# Patient Record
Sex: FEMALE | Race: WHITE | Employment: OTHER | URBAN - METROPOLITAN AREA
[De-identification: names, ages, dates, MRNs, and addresses within clinical notes are randomized per-mention and may not be internally consistent; named-entity substitution may affect disease eponyms.]

---

## 2019-02-19 ENCOUNTER — HOSPITAL ENCOUNTER (OUTPATIENT)
Dept: PHYSICAL THERAPY | Age: 84
Discharge: HOME | End: 2019-02-19
Payer: MEDICARE

## 2019-02-19 PROCEDURE — 97110 THERAPEUTIC EXERCISES: CPT

## 2019-02-19 PROCEDURE — 97161 PT EVAL LOW COMPLEX 20 MIN: CPT

## 2019-02-19 PROCEDURE — 97140 MANUAL THERAPY 1/> REGIONS: CPT

## 2019-02-19 NOTE — PROGRESS NOTES
PHYSICAL THERAPY - DAILY TREATMENT NOTE Patient Name: Saba Willingham        Date: 2019 : 1935   YES Patient  Verified Visit #:     Insurance: Payor: Diamond Yepez / Plan: [address] [place] [address] HMO / Product Type: Managed Care Medicare / In time: 11:05 A Out time: 12:00 P Total Treatment Time: 55  
 
BCBS/Medicare Time Tracking (below) Total Timed Codes (min):  55 1:1 Treatment Time:  50 TREATMENT AREA =  Knee pain, right [M25.561] SUBJECTIVE Pain Level (on 0 to 10 scale):  0  / 10 Medication Changes/New allergies or changes in medical history, any new surgeries or procedures? NO    If yes, update Summary List  
Subjective Functional Status/Changes:  []  No changes reported See POC Modalities Rationale:    PD 
 min [] Estim, type/location:   
                                 []  att     []  unatt     []  w/US     []  w/ice    []  w/heat 
 min []  Mechanical Traction: type/lbs   
               []  pro   []  sup   []  int   []  cont    []  before manual    []  after manual  
 min []  Ultrasound, settings/location:    
 min []  Iontophoresis w/ dexamethasone, location:   
                                           []  take home patch       []  in clinic  
 min []  Ice     []  Heat    location/position:   
 min []  Vasopneumatic Device, press/temp:   
 min []  Other:   
[] Skin assessment post-treatment (if applicable):   
[]  intact    []  redness- no adverse reaction    
[]redness  adverse reaction:     
10 min Therapeutic Exercise:  [x]  See flow sheet Rationale:      increase ROM and increase strength to improve the patients ability to return to pain-free ambulation 10 min Manual Therapy: Patellar mobs, gentle passive stretch into TKE as well as manual stretch into flexion in sitting Rationale:      increase ROM to improve patient's ability to amb with decreased ext lag Billed With/As: 
 [] TE 
 [] TA 
 [] Neuro [] Self Care Patient Education: [x] Review HEP [] Progressed/Changed HEP based on:  
[] positioning   [] body mechanics   [] transfers   [] heat/ice application   
[] other:   
Other Objective/Functional Measures: 
 
See POC Post Treatment Pain Level (on 0 to 10) scale:   0  / 10 ASSESSMENT Assessment/Changes in Function:  
 
See POC []  See Progress Note/Recertification Patient will continue to benefit from skilled PT services to modify and progress therapeutic interventions, address functional mobility deficits, address ROM deficits, address strength deficits and instruct in home and community integration to attain remaining goals. Progress toward goals / Updated goals: 
Progressing towards goals established at [facility] PLAN 
[]  Upgrade activities as tolerated YES Continue plan of care  
[]  Discharge due to :   
[]  Other:   
 
Therapist: Bety Chavez PT Date: 2/19/2019 Time: 4:10 PM  
 
Future Appointments Date Time Provider Angela Tao 2/22/2019  8:30 AM Caryle Rater, PT [facility]  
2/26/2019  9:30 AM Caryle Rater, PT [facility]  
3/1/2019 10:30 AM Caryle Rater, PT [facility]  
3/4/2019  9:30 AM [facility]  
3/8/2019  9:30 AM Caryle Rater, PT [facility]  
3/11/2019 10:30 AM Caryle Rater, PT [facility]  
3/15/2019 10:30 AM [facility]

## 2019-02-19 NOTE — PROGRESS NOTES
Renny Barnard [address]  [facility] [place] [facility]  [facility] 
[address], [id number], [address]  Phone: [phone]  Fax: [phone] PLAN OF CARE / STATEMENT OF MEDICAL NECESSITY FOR PHYSICAL THERAPY SERVICES Patient Name: Favio Jha : 1935 Medical  
Diagnosis: Knee pain, right [M25.561] Treatment Diagnosis: R TKR Onset Date: 19 Referral Source: Manish Smith MD Start of Care [facility] [place]): 2019 Prior Hospitalization: See medical history Provider #: [id number] Prior Level of Function: Manageable sx with ADLs, fitness program at recreational center 3 days/walk, daily walking program, amb without AD Comorbidities: H/o HTN, L THR 2008 Medications: Verified on Patient Summary List  
The Plan of Care and following information is based on the information from the initial evaluation.  
================================================================================== Assessment / key information:  Patient is a pleasant 81 y.o. female who presents to [facility] s/p R TKR on 19. Patient reports chronic h/o R knee pain which failed to respond to conservative treatment, she was hospitalized for 2 days & received home health up until 2-15-19 which were of unknown etiology. Current c/o pain are intermittent in nature & worsen with activities such as prolonged WBing such as standing & walking & after peridos of prolonged sitting when she rises as well as sleeping at night uninterrupted >2 hours & at times sleeps in her recliner. Sx improve with CP & use of tylenol or oxy prn. Average reported pain level at 2-3/10, 6-7/10 at worst & 0/10 at best. She DC use of RW as well as SPC last week with no falls or safety concerns. Upon objective evaluation, patient demonstrates ROM as follows R 4-70 deg of flexion in supine, 85 deg of flexion in sitting.   MMT revealed overall R knee strength at 4/5 within available ROM. Hypomobile patella, mod signs of swelling & warmth to palpation, otherwise incision appears to be healing well. Slight leg length discrepancy, R LE at 34.5\" as compared to 34\" on L LE. Patient can benefit PT interventions to improve ROM, decrease pain & swelling & normalize gait to facilitate return to unlimited ADLs, work activities & overall functional status. ================================================================================== Eval Complexity: History MEDIUM  Complexity : 1-2 comorbidities / personal factors will impact the outcome/ POC ;  Examination  MEDIUM Complexity : 3 Standardized tests and measures addressing body structure, function, activity limitation and / or participation in recreation ; Presentation LOW Complexity : Stable, uncomplicated ;  Decision Making MEDIUM Complexity : FOTO score of 26-74; Overall Complexity LOW Problem List: pain affecting function, decrease ROM, decrease strength, edema affecting function, impaired gait/ balance, decrease ADL/ functional abilitiies, decrease activity tolerance, decrease flexibility/ joint mobility, decrease transfer abilities and other FOTO 53 Treatment Plan may include any combination of the following: Therapeutic exercise, Therapeutic activities, Neuromuscular re-education, Physical agent/modality, Gait/balance training, Manual therapy, Aquatic therapy, Patient education, Self Care training, Functional mobility training, Home safety training and Stair training Patient / Family readiness to learn indicated by: asking questions, trying to perform skills and interestPersons(s) to be included in education: patient (P) Barriers to Learning/Limitations: None Measures taken:   
Patient Goal (s): \"less pain & better motion with knee\" Patient self reported health status: good Rehabilitation Potential: good ? Short Term Goals: To be accomplished in  2  weeks: 1) Establish HEP to prevent further disability. 2) Patient will report decreased c/o pain to < or = 1-2/10 to facilitate sleeping at night  with manageable sx in R knee. 3) Improve FOTO score from 53 points to > or = 58 points indicating improved tolerance with ADLs in regards to R knee. 4) Patient will improve R knee AROM to 0-95 degrees so ROM is available for dressing activities. ? Long Term Goals: To be accomplished in  4  weeks: 
1) Improve FOTO score from 58 points to > or = 63 points indicating improved tolerance with ADLs in regards to R knee. 2) Patient will improve R knee AROM to 0-110 degrees so ROM is available for dressing activities. 3) Improve overall strength of R knee to 5-/5 with no c/o pain upon MMT so strength is available for return to previous fitness program. 4) Patient will be able to negotiate multiple steps using 1 HR & reciprocal pattern with no safety concerns noted. Frequency / Duration:   Patient to be seen  2-3  times per week for 4  weeks: 
Patient / Caregiver education and instruction: self care, activity modification, brace/ splint application and exercises Therapist Signature: AIME Castañeda, cert MDT Date: 0/82/0043 Certification Period: 2-19-19 to 5-17-19 Time: 11:11 AM  
================================================================================== I certify that the above Physical Therapy Services are being furnished while the patient is under my care. I agree with the treatment plan and certify that this therapy is necessary. Physician Signature:       Date:      Time:  Please sign and return to [facility] [place] or you may fax the signed copy to [phone]. Thank you.

## 2019-02-22 ENCOUNTER — HOSPITAL ENCOUNTER (OUTPATIENT)
Dept: PHYSICAL THERAPY | Age: 84
Discharge: HOME | End: 2019-02-22
Payer: MEDICARE

## 2019-02-22 PROCEDURE — 97110 THERAPEUTIC EXERCISES: CPT

## 2019-02-22 NOTE — PROGRESS NOTES
PHYSICAL THERAPY - DAILY TREATMENT NOTE Patient Name: Saba Willingham        Date: 2019 : 1935   YES Patient  Verified Visit #:     Insurance: Payor: Diamond Yepez / Plan: [address] [place] [address] HMO / Product Type: Managed Care Medicare / In time: 8:25 A Out time: 9:25 A Total Treatment Time: 55  
 
BCBS/Medicare Time Tracking (below) Total Timed Codes (min):  45 1:1 Treatment Time:  40 TREATMENT AREA =  Knee pain, right [M25.561] SUBJECTIVE Pain Level (on 0 to 10 scale):  0  / 10 Medication Changes/New allergies or changes in medical history, any new surgeries or procedures? NO    If yes, update Summary List  
Subjective Functional Status/Changes:  []  No changes reported Patient reports she has f/u with MD & they ordered a flexion brace from [employer] & was given a steroid (unsure of name, not filled yet). Modalities Rationale:     decrease edema and decrease pain to improve patient's ability to return to pain-free sleeping 
 min [] Estim, type/location:   
                                 []  att     []  unatt     []  w/US     []  w/ice    []  w/heat 
 min []  Mechanical Traction: type/lbs   
               []  pro   []  sup   []  int   []  cont    []  before manual    []  after manual  
 min []  Ultrasound, settings/location:    
 min []  Iontophoresis w/ dexamethasone, location:   
                                           []  take home patch       []  in clinic  
10 min [x]  Ice     []  Heat    location/position: Supine to R knee  
 min []  Vasopneumatic Device, press/temp:   
 min []  Other:   
[] Skin assessment post-treatment (if applicable):   
[]  intact    []  redness- no adverse reaction    
[]redness  adverse reaction:     
35// 
30 min Therapeutic Exercise:  [x]  See flow sheet Rationale:      increase ROM and increase strength to improve the patients ability to return to rec stair negotiation 10 min Manual Therapy: Gentle manual stretch for flexion in supine, patellar mobs Rationale:      decrease pain and increase ROM to improve patient's ability to ambulate with decreased ext lag Billed With/As: 
 [] TE 
 [] TA 
 [] Neuro 
 [] Self Care Patient Education: [x] Review HEP [] Progressed/Changed HEP based on:  
[] positioning   [] body mechanics   [] transfers   [] heat/ice application   
[] other:   
Other Objective/Functional Measures: 
 
Initiated TE per flow sheet Post Treatment Pain Level (on 0 to 10) scale:   1  / 10 ASSESSMENT Assessment/Changes in Function:  
 
Improved tolerance to TE today as well as manual stretching 
  
[]  See Progress Note/Recertification Patient will continue to benefit from skilled PT services to modify and progress therapeutic interventions, address functional mobility deficits, address ROM deficits and address strength deficits to attain remaining goals. Progress toward goals / Updated goals: 
Progressing towards goals established at [facility] PLAN 
[]  Upgrade activities as tolerated YES Continue plan of care  
[]  Discharge due to :   
[]  Other:   
 
Therapist: Lisandro Forbes PT Date: 2/22/2019 Time: 9:22 AM  
 
Future Appointments Date Time Provider Angela Tao 2/26/2019  9:30 AM Jonathan Nieto PT [facility]  
3/1/2019 10:30 AM Jonathan Nieto PT [facility]  
3/4/2019  [facility]  
3/8/2019  9:30 AM Jonathan Nieto PT [facility]  
3/11/2019 10:30 AM Jonathan Nieto PT [facility]  
3/15/2019 10:30 AM Deonna [facility]

## 2019-02-26 ENCOUNTER — HOSPITAL ENCOUNTER (OUTPATIENT)
Dept: PHYSICAL THERAPY | Age: 84
Discharge: HOME | End: 2019-02-26
Payer: MEDICARE

## 2019-02-26 PROCEDURE — 97110 THERAPEUTIC EXERCISES: CPT

## 2019-02-26 PROCEDURE — 97140 MANUAL THERAPY 1/> REGIONS: CPT

## 2019-02-26 NOTE — PROGRESS NOTES
PHYSICAL THERAPY - DAILY TREATMENT NOTE Patient Name: Hershell Scheuermann        Date: 2019 : 1935   YES Patient  Verified Visit #:   3   of   12  Insurance: Payor: Mary Kay Albarran / Plan: [address] [place] [address] HMO / Product Type: Managed Care Medicare / In time: 9:30 A Out time: 10:30 A Total Treatment Time: 55  
 
BCBS/Medicare Time Tracking (below) Total Timed Codes (min):  45 1:1 Treatment Time:  40 TREATMENT AREA =  Knee pain, right [M25.561] SUBJECTIVE Pain Level (on 0 to 10 scale):  0  / 10 Medication Changes/New allergies or changes in medical history, any new surgeries or procedures? NO    If yes, update Summary List  
Subjective Functional Status/Changes:  []  No changes reported Patient reports she has not received any information from [employer] about her brace, she is doing her exercises as directed. Modalities Rationale:     decrease edema, decrease inflammation and decrease pain to improve patient's ability to return to pain-free ambulation  
 min [] Estim, type/location:   
                                 []  att     []  unatt     []  w/US     []  w/ice    []  w/heat 
 min []  Mechanical Traction: type/lbs   
               []  pro   []  sup   []  int   []  cont    []  before manual    []  after manual  
 min []  Ultrasound, settings/location:    
 min []  Iontophoresis w/ dexamethasone, location:   
                                           []  take home patch       []  in clinic  
10 min [x]  Ice     []  Heat    location/position: Supine to R knee   
 min []  Vasopneumatic Device, press/temp:   
 min []  Other:   
[] Skin assessment post-treatment (if applicable):   
[]  intact    []  redness- no adverse reaction    
[]redness  adverse reaction:     
35/ 
30 min Therapeutic Exercise:  [x]  See flow sheet Rationale:      increase ROM and increase strength to improve the patients ability to return to gentle squatting 10 min Manual Therapy: Patellar mobs in all directions, gentle flexion passive stretching in supine Rationale:      decrease pain and increase ROM to improve patient's ability to return to amb without ext lag Billed With/As: 
 [] TE 
 [] TA 
 [] Neuro 
 [] Self Care Patient Education: [x] Review HEP [] Progressed/Changed HEP based on:  
[] positioning   [] body mechanics   [] transfers   [] heat/ice application   
[] other:   
Other Objective/Functional Measures: Added TKE with YTB today AAROM: flexion: 100 deg in supine with belt Post Treatment Pain Level (on 0 to 10) scale:   0  / 10 ASSESSMENT Assessment/Changes in Function:  
 
Improved tolerance to flexion based stretches and manual therapy 
  
[]  See Progress Note/Recertification Patient will continue to benefit from skilled PT services to modify and progress therapeutic interventions, address functional mobility deficits, address ROM deficits, analyze and address soft tissue restrictions, address imbalance/dizziness and instruct in home and community integration to attain remaining goals. Progress toward goals / Updated goals: 
Progressing towards STG 4 (partially met) PLAN 
[]  Upgrade activities as tolerated YES Continue plan of care  
[]  Discharge due to :   
[]  Other:   
 
Therapist: Indiana Hernandez PT Date: 2/26/2019 Time: 1:41 PM  
 
Future Appointments Date Time Provider Angela Tao 3/1/2019 10:30 AM Angelia Heaton PT [facility]  
3/4/2019  9:30 AM Leyla Park [facility]  
3/8/2019  9:30 AM Angelia Heaton PT [facility]  
3/11/2019 10:30 AM Angelia Heaton PT [facility]  
3/15/2019 10:30 AM Daniel [facility]

## 2019-03-01 ENCOUNTER — HOSPITAL ENCOUNTER (OUTPATIENT)
Dept: PHYSICAL THERAPY | Age: 84
Discharge: HOME | End: 2019-03-01
Payer: MEDICARE

## 2019-03-01 PROCEDURE — 97110 THERAPEUTIC EXERCISES: CPT

## 2019-03-01 PROCEDURE — 97140 MANUAL THERAPY 1/> REGIONS: CPT

## 2019-03-04 ENCOUNTER — HOSPITAL ENCOUNTER (OUTPATIENT)
Dept: PHYSICAL THERAPY | Age: 84
Discharge: HOME | End: 2019-03-04
Payer: MEDICARE

## 2019-03-04 PROCEDURE — 97140 MANUAL THERAPY 1/> REGIONS: CPT

## 2019-03-04 PROCEDURE — 97110 THERAPEUTIC EXERCISES: CPT

## 2019-03-04 NOTE — PROGRESS NOTES
PHYSICAL THERAPY - DAILY TREATMENT NOTE Patient Name: Francisco Javier Finley        Date: 3/4/2019 : 1935   YES Patient  Verified Visit #:     Insurance: Payor: Latha Mendez / Plan: [address] [place] [address] HMO / Product Type: Managed Care Medicare / In time: 9:25A Out time: 10:30A Total Treatment Time: 65  
 
BCBS/Medicare Time Tracking (below) Total Timed Codes (min):  55 1:1 Treatment Time:  40 TREATMENT AREA =  Knee pain, right [M25.561] SUBJECTIVE Pain Level (on 0 to 10 scale):  0  / 10 Medication Changes/New allergies or changes in medical history, any new surgeries or procedures? NO    If yes, update Summary List  
Subjective Functional Status/Changes:  []  No changes reported \"I am doing good my knee is just really stiff. \"    
  
 
 Modalities Rationale:     decrease inflammation and decrease pain to improve patient's ability to perform unlimited ADLs  
 min [] Estim, type/location:   
                                 []  att     []  unatt     []  w/US     []  w/ice    []  w/heat 
 min []  Mechanical Traction: type/lbs   
               []  pro   []  sup   []  int   []  cont    []  before manual    []  after manual  
 min []  Ultrasound, settings/location:    
 min []  Iontophoresis w/ dexamethasone, location:   
                                           []  take home patch       []  in clinic  
10 min [x]  Ice     []  Heat    location/position: R knee in supine   
 min []  Vasopneumatic Device, press/temp:   
 min []  Other:   
[] Skin assessment post-treatment (if applicable):   
[]  intact    []  redness- no adverse reaction    
[]redness  adverse reaction:     
45/30 min Therapeutic Exercise:  [x]  See flow sheet Rationale:      increase ROM, increase strength, improve coordination, improve balance and increase proprioception to improve the patients ability to perform unlimited ADLs 10 min Manual Therapy: Gentle PROM into flexion in supine, gentle inferior and superior patellar mobilizations. Rationale:      decrease pain, increase ROM and increase tissue extensibility to improve patient's ability to perform unlimited dressing. Billed With/As: 
 [] TE 
 [] TA 
 [] Neuro 
 [] Self Care Patient Education: [x] Review HEP [] Progressed/Changed HEP based on:  
[] positioning   [] body mechanics   [] transfers   [] heat/ice application   
[] other:   
Other Objective/Functional Measures: 
 
Notable increase in tightness into flexion today to 97 degrees with ERP. Post Treatment Pain Level (on 0 to 10) scale:   0  / 10 ASSESSMENT Assessment/Changes in Function:  
 
Patient is making slow, gradual progress with physical therapy she continues to be limited into flexion. []  See Progress Note/Recertification Patient will continue to benefit from skilled PT services to modify and progress therapeutic interventions, address functional mobility deficits, address ROM deficits, address strength deficits, analyze and address soft tissue restrictions, analyze and cue movement patterns, analyze and modify body mechanics/ergonomics, assess and modify postural abnormalities, address imbalance/dizziness and instruct in home and community integration to attain remaining goals. Progress toward goals / Updated goals: 
Slow progress towards ROM goals. PLAN [x]  Upgrade activities as tolerated YES Continue plan of care  
[]  Discharge due to :   
[]  Other:   
 
Therapist: Mague Potter Date: 3/4/2019 Time: 1:44 PM  
 
Future Appointments Date Time Provider Angela Tao 3/8/2019  9:30 AM Angelia Heaton PT [facility]  
3/11/2019 10:30 AM Angelia Heaton PT [facility]  
3/15/2019 10:30 AM Daniel [facility]

## 2019-03-04 NOTE — PROGRESS NOTES
PHYSICAL THERAPY - DAILY TREATMENT NOTE Patient Name: Angélica Mae        Date: 3/01/2019 : 1935   YES Patient  Verified Visit #:     Insurance: Payor: Yon Solano / Plan: [address] [place] [address] HMO / Product Type: Managed Care Medicare / In time: 10:30 A Out time: 11:35 A Total Treatment Time: 60  
 
BCBS/Medicare Time Tracking (below) Total Timed Codes (min):  50 1:1 Treatment Time:  30 TREATMENT AREA =  Knee pain, right [M25.561] SUBJECTIVE Pain Level (on 0 to 10 scale):  0  / 10 Medication Changes/New allergies or changes in medical history, any new surgeries or procedures? NO    If yes, update Summary List  
Subjective Functional Status/Changes:  []  No changes reported Patient reports she is waiting on insurance approval for her knee brace but has spoken to the medical rep. Modalities Rationale:     decrease pain to improve patient's ability to return to pain-free with ADLs 
 min [] Estim, type/location:   
                                 []  att     []  unatt     []  w/US     []  w/ice    []  w/heat 
 min []  Mechanical Traction: type/lbs   
               []  pro   []  sup   []  int   []  cont    []  before manual    []  after manual  
 min []  Ultrasound, settings/location:    
 min []  Iontophoresis w/ dexamethasone, location:   
                                           []  take home patch       []  in clinic  
10 min [x]  Ice     []  Heat    location/position: Supine to R knee   
 min []  Vasopneumatic Device, press/temp:   
 min []  Other:   
[] Skin assessment post-treatment (if applicable):   
[]  intact    []  redness- no adverse reaction    
[]redness  adverse reaction:     
40/ 
20 min Therapeutic Exercise:  [x]  See flow sheet Rationale:      increase ROM and increase strength to improve the patients ability to return to pain-free standing 10 min Manual Therapy: Patellar mobs in all directions, gentle manual stretch for flexion in supine Rationale:      decrease pain and increase ROM to improve patient's ability to return to pain-free ambulation Billed With/As: 
 [] TE 
 [] TA 
 [] Neuro 
 [] Self Care Patient Education: [x] Review HEP [] Progressed/Changed HEP based on:  
[] positioning   [] body mechanics   [] transfers   [] heat/ice application   
[] other:   
Other Objective/Functional Measures: 
 
Flexion: 100 deg AAROM with belt Post Treatment Pain Level (on 0 to 10) scale:   0  / 10 ASSESSMENT Assessment/Changes in Function:  
 
Improving tolerance to self flexion stretches as well as manual therapy 
  
[]  See Progress Note/Recertification Patient will continue to benefit from skilled PT services to modify and progress therapeutic interventions, address functional mobility deficits, address ROM deficits, address strength deficits, assess and modify postural abnormalities, address imbalance/dizziness and instruct in home and community integration to attain remaining goals. Progress toward goals / Updated goals: 
Progressing towards STG 2 & 4  
 
PLAN 
[]  Upgrade activities as tolerated YES Continue plan of care  
[]  Discharge due to :   
[]  Other:   
 
Therapist: Terra Villaseñor PT Date: 3/01/2019 Time: 12:00 PM  
 
Future Appointments Date Time Provider Angela Tao 3/4/2019  9[facility]  
3/8/2019  9:30 AM Michel Mccurdy, PT [facility]  
3/11/2019 10:30 AM Michel Mccurdy, PT [facility]  
3/15/2019 10:30 AM Hermes Brittle [facility]

## 2019-03-08 ENCOUNTER — HOSPITAL ENCOUNTER (OUTPATIENT)
Dept: PHYSICAL THERAPY | Age: 84
Discharge: HOME | End: 2019-03-08
Payer: MEDICARE

## 2019-03-08 PROCEDURE — 97140 MANUAL THERAPY 1/> REGIONS: CPT

## 2019-03-08 PROCEDURE — 97110 THERAPEUTIC EXERCISES: CPT

## 2019-03-08 NOTE — PROGRESS NOTES
Renny Barnard 32  [facility]  [facility]  [facility], [address] ,[address], [address]  Phone: [phone]  Fax: [phone]  PROGRESS NOTE  Patient Name: Cecily Shi : 1935   Treatment/Medical Diagnosis: Knee pain, right [M25.561]   Referral Source: Belinda Booth MD     Date of Initial Visit: 19 Attended Visits: 6 Missed Visits: 0     SUMMARY OF TREATMENT  Therapeutic exercise including ROM, stretching, gentle strengthening, gait & balance training, postural ed, patient education, HEP instruction, CP. CURRENT STATUS  Ms. Sanders continues to make steady progress with intermittent c/o pain in R knee, she is taking tylenol prn at home to manage her pain. She reports difficulty with sleeping in her bed > 3 hours & is able to sleep in her recliner for the rest of the night. Flexion ROM continues to be limited although tolerance flexion stretches as well as manual stretches continues to improve. She has not received her flexion brace from [facility] at this time due to insurance authorization issues although good compliance with daily home program.    Goal/Measure of Progress Goal Met? 1.  Establish HEP to prevent further disability. Status at last Eval: NA Current Status: HEP established  yes   2. Patient will report decreased c/o pain to < or = 1-2/10 to facilitate sleeping at night  with manageable sx in R knee. Status at last Eval: Average 2-3/10  At worst 6-7/10  At best 0/10 Current Status: Average 2/10  At worst 6/10  At best 0/10 progressing   3. Improve FOTO score from 53 points to > or = 58 points indicating improved tolerance with ADLs in regards to R knee. Status at last Eval: 53 Current Status: TBA yes   4. Patient will improve R knee AROM to 0-95 degrees so ROM is available for dressing activities.      Status at last Eval: 4-70 deg Current Status: 3-103 deg PROM (supine) Partially met     New Goals to be achieved in __3-4__  weeks:  1. Improve FOTO score from 53 points to > or = 58 points indicating improved tolerance with ADLs in regards to R knee. 2.  Patient will improve R knee AROM to 0-110 degrees so ROM is available for dressing activities. 3.  Patient will be able to negotiate multiple steps using 1 HR & reciprocal pattern with no safety concerns noted. 4.  Improve overall strength of R knee to 5-/5 with no c/o pain upon MMT so strength is available for return to previous fitness program.       RECOMMENDATIONS  Patient to continue with PT for up to 3-4 more weeks in order to progress towards achieving all LTGs. If you have any questions/comments please contact us directly at [phone]. Thank you for allowing us to assist in the care of your patient. Therapist Signature: AIME Keyes, cert MDT Date: 9/8/3434     Time: 8:35 AM   NOTE TO PHYSICIAN:  PLEASE COMPLETE THE ORDERS BELOW AND FAX TO   [facility] [place] [facility]: [phone]. If you are unable to process this request in 24 hours please contact our office: [phone].    ___ I have read the above report and request that my patient continue as recommended.   ___ I have read the above report and request that my patient continue therapy with the following changes/special instructions:_________________________________________________________   ___ I have read the above report and request that my patient be discharged from therapy.      Physician Signature:        Date:       Time:

## 2019-03-08 NOTE — PROGRESS NOTES
PHYSICAL THERAPY - DAILY TREATMENT NOTE    Patient Name: Genny Acevedo        Date: 3/8/2019  : 1935   YES Patient  Verified  Visit #:     Insurance: Payor: Geremiasalice Weiss / Plan: [address] [place] [address] HMO / Product Type: Managed Care Medicare /      In time: 9:30 A Out time: 10:33 A   Total Treatment Time: 55     BCBS/Medicare Time Tracking (below)   Total Timed Codes (min):  45 1:1 Treatment Time:  40     TREATMENT AREA = Knee pain, right [M25.561]    SUBJECTIVE  Pain Level (on 0 to 10 scale):  0  / 10   Medication Changes/New allergies or changes in medical history, any new surgeries or procedures?     NO    If yes, update Summary List   Subjective Functional Status/Changes:  []  No changes reported     See PN to MD           Modalities Rationale:     decrease edema, decrease inflammation and decrease pain to improve patient's ability to return to pain-free ambulation    min [] Estim, type/location:                                      []  att     []  unatt     []  w/US     []  w/ice    []  w/heat    min []  Mechanical Traction: type/lbs                   []  pro   []  sup   []  int   []  cont    []  before manual    []  after manual    min []  Ultrasound, settings/location:      min []  Iontophoresis w/ dexamethasone, location:                                               []  take home patch       []  in clinic   10 min [x]  Ice     []  Heat    location/position: Supine to R knee    min []  Vasopneumatic Device, press/temp:     min []  Other:    [] Skin assessment post-treatment (if applicable):    []  intact    []  redness- no adverse reaction     []redness  adverse reaction:        35/  30 min Therapeutic Exercise:  [x]  See flow sheet   Rationale:      increase ROM and increase strength to improve the patients ability to return to pain-free standing     10 min Manual Therapy: Patellar mobs in all directions, gentle manual flexion stretch   Rationale:      decrease pain and increase ROM to improve patient's ability to return to indep dressing      Billed With/As:   [] TE   [] TA   [] Neuro   [] Self Care Patient Education: [x] Review HEP    [] Progressed/Changed HEP based on:   [] positioning   [] body mechanics   [] transfers   [] heat/ice application    [] other:      Other Objective/Functional Measures:    PROM: 3- 103 deg (supine)     Post Treatment Pain Level (on 0 to 10) scale:   0  / 10     ASSESSMENT  Assessment/Changes in Function:     Steady progress with flexion ROM, v/c for TKE with ambulation      []  See Progress Note/Recertification   Patient will continue to benefit from skilled PT services to modify and progress therapeutic interventions, address functional mobility deficits, address ROM deficits, address strength deficits, assess and modify postural abnormalities, address imbalance/dizziness and instruct in home and community integration to attain remaining goals.    Progress toward goals / Updated goals:    Progressing towards STG 2, 3, 4, STG 1 met     PLAN  []  Upgrade activities as tolerated YES Continue plan of care   []  Discharge due to :    [x]  Other: Issued PN for f/u with MD     Therapist: Lisseth Wheatley PT    Date: 3/8/2019 Time: 8:33 AM     Future Appointments   Date Time Provider Angela Tao   3/8/2019  9:30 AM Fracisco oMlina PT [facility]   3/11/2019 10:30 AM Fracisco Molina PT [facility]   3/15/2019 10:30 AM Khalif Perry [facility]

## 2019-03-11 ENCOUNTER — HOSPITAL ENCOUNTER (OUTPATIENT)
Dept: PHYSICAL THERAPY | Age: 84
Discharge: HOME | End: 2019-03-11
Payer: MEDICARE

## 2019-03-11 PROCEDURE — 97110 THERAPEUTIC EXERCISES: CPT

## 2019-03-11 PROCEDURE — 97140 MANUAL THERAPY 1/> REGIONS: CPT

## 2019-03-11 NOTE — PROGRESS NOTES
PHYSICAL THERAPY - DAILY TREATMENT NOTE    Patient Name: Vee Linares        Date: 3/11/2019  : 1935   YES Patient  Verified  Visit #:     Insurance: Payor: Renu Sanz / Plan: [address] [place] [address] HMO / Product Type: Managed Care Medicare /      In time: 10:30 A Out time: 11:30 A   Total Treatment Time: 55     BCBS/Medicare Time Tracking (below)   Total Timed Codes (min):  45 1:1 Treatment Time:  40     TREATMENT AREA =  Knee pain, right [M25.561]    SUBJECTIVE  Pain Level (on 0 to 10 scale):  0  / 10   Medication Changes/New allergies or changes in medical history, any new surgeries or procedures? NO    If yes, update Summary List   Subjective Functional Status/Changes:  []  No changes reported     Patient reports she had f/u with MD & she will have her next f/u in 2 weeks & he would like her to get to 120 degrees of knee bending.             Modalities Rationale:     decrease edema, decrease inflammation and decrease pain to improve patient's ability to return to pain-free standing   min [] Estim, type/location:                                      []  att     []  unatt     []  w/US     []  w/ice    []  w/heat    min []  Mechanical Traction: type/lbs                   []  pro   []  sup   []  int   []  cont    []  before manual    []  after manual    min []  Ultrasound, settings/location:      min []  Iontophoresis w/ dexamethasone, location:                                               []  take home patch       []  in clinic   10 min [x]  Ice     []  Heat    location/position: Supine to R knee    min []  Vasopneumatic Device, press/temp:     min []  Other:    [] Skin assessment post-treatment (if applicable):    []  intact    []  redness- no adverse reaction     []redness  adverse reaction:        25/  15 min Therapeutic Exercise:  [x]  See flow sheet   Rationale:      increase ROM and increase strength to improve the patients ability to return to pain-free standing    25 min Manual Therapy: Manual stretching to improve flexion in supine, contract relax stretch to improve flexion in prone   Rationale:      decrease pain and increase ROM to improve patient's ability to perform dressing with R knee    Billed With/As:   [] TE   [] TA   [] Neuro   [] Self Care Patient Education: [x] Review HEP    [] Progressed/Changed HEP based on:   [] positioning   [] body mechanics   [] transfers   [] heat/ice application    [] other:      Other Objective/Functional Measures:    Flexion: 104 deg with belt in supine (gentle OP from PT with this stretch)     Post Treatment Pain Level (on 0 to 10) scale:   0  / 10     ASSESSMENT  Assessment/Changes in Function:     Good tolerance to progression of flexion stretches today      []  See Progress Note/Recertification   Patient will continue to benefit from skilled PT services to modify and progress therapeutic interventions, address functional mobility deficits, address ROM deficits, address strength deficits, assess and modify postural abnormalities, address imbalance/dizziness and instruct in home and community integration to attain remaining goals.    Progress toward goals / Updated goals:    Progressing towards newly established LTGs      PLAN  []  Upgrade activities as tolerated YES Continue plan of care   []  Discharge due to :    []  Other:      Therapist: Francisco J Jon PT    Date: 3/11/2019 Time: 12:36 PM     Future Appointments   Date Time Provider Angela Tao   3/15/2019 10:30 AM [facility]   3/19/2019 12:00 PM [facility]   3/21/2019 10:00 AM Domi Piper PT [facility]   3/26/2019 12:00 PM [facility]   3/28/2019 10:30 AM Domi Piper PT [facility]   4/2/2019 10:30 AM Domi Piper PT [facility]   4/4/2019 10:30 AM [facility]   4/9/2019 10:30 AM [facility]   4/11/2019 10:30 AM [facility]

## 2019-03-15 ENCOUNTER — HOSPITAL ENCOUNTER (OUTPATIENT)
Dept: PHYSICAL THERAPY | Age: 84
Discharge: HOME | End: 2019-03-15
Payer: MEDICARE

## 2019-03-15 PROCEDURE — 97110 THERAPEUTIC EXERCISES: CPT

## 2019-03-15 PROCEDURE — 97140 MANUAL THERAPY 1/> REGIONS: CPT

## 2019-03-15 NOTE — PROGRESS NOTES
PHYSICAL THERAPY - DAILY TREATMENT NOTE    Patient Name: Nathalie Meléndez        Date: 3/15/2019  : 1935   YES Patient  Verified  Visit #:     Insurance: Payor: Reed Reynaga / Plan: [address] [place] [address] HMO / Product Type: Managed Care Medicare /      In time: 10:25A Out time: 11:45A   Total Treatment Time: 80     BCBS/Medicare Time Tracking (below)   Total Timed Codes (min):  70 1:1 Treatment Time:  40     TREATMENT AREA =  Knee pain, right [M25.561]    SUBJECTIVE  Pain Level (on 0 to 10 scale):  0  / 10   Medication Changes/New allergies or changes in medical history, any new surgeries or procedures? NO    If yes, update Summary List   Subjective Functional Status/Changes:  []  No changes reported     \"I mowed my lawn yesterday\"     Patient reports wearing dynasplint throughout the week and now is wearing it 1.5 hours at a time.             Modalities Rationale:     decrease inflammation and decrease pain to improve patient's ability to perform unlimited AROM for dressing   min [] Estim, type/location:                                      []  att     []  unatt     []  w/US     []  w/ice    []  w/heat    min []  Mechanical Traction: type/lbs                   []  pro   []  sup   []  int   []  cont    []  before manual    []  after manual    min []  Ultrasound, settings/location:      min []  Iontophoresis w/ dexamethasone, location:                                               []  take home patch       []  in clinic   10 min [x]  Ice     []  Heat    location/position: R knee in supine    min []  Vasopneumatic Device, press/temp:     min []  Other:    [] Skin assessment post-treatment (if applicable):    []  intact    []  redness- no adverse reaction     []redness  adverse reaction:        55/25 min Therapeutic Exercise:  [x]  See flow sheet   Rationale:      increase ROM, increase strength, improve coordination, improve balance and increase proprioception to improve the patients ability to perform unlimited ambulation     15 min Manual Therapy: R knee PROM in supine, prone contract relax and manual quadricep stretch, posterior glide of tibia grade 1-4   Rationale:      decrease pain, increase ROM and increase tissue extensibility to improve patient's ability to perform unlimited AROM for dressing. Billed With/As:   [] TE   [] TA   [] Neuro   [] Self Care Patient Education: [x] Review HEP    [] Progressed/Changed HEP based on:   [] positioning   [] body mechanics   [] transfers   [] heat/ice application    [] other:      Other Objective/Functional Measures:    AAROM 0-108 today. Met with dynasplint  Don Bedolla during appointment, she adjusted patient dynasplint to L 8 and educate to continue wearing 1.5 hours a day. Post Treatment Pain Level (on 0 to 10) scale:   0  / 10     ASSESSMENT  Assessment/Changes in Function:     Patient is making good progress with PT rx and use of dynasplint outside of therapy, she was educated to continue use of ice throughout the day to minimize swelling throughout the R knee. []  See Progress Note/Recertification   Patient will continue to benefit from skilled PT services to modify and progress therapeutic interventions, address functional mobility deficits, address ROM deficits, address strength deficits, analyze and address soft tissue restrictions, analyze and cue movement patterns, analyze and modify body mechanics/ergonomics, assess and modify postural abnormalities, address imbalance/dizziness and instruct in home and community integration to attain remaining goals. Progress toward goals / Updated goals:    Progressing towards ROM goals.      PLAN  [x]  Upgrade activities as tolerated YES Continue plan of care   []  Discharge due to :    []  Other:      Therapist: Rosario iDsla    Date: 3/15/2019 Time: 12:38 PM     Future Appointments   Date Time Provider Angela Tao   3/19/2019 12:00 PM [facility] 3/21/2019 10:00 AM Amanuel Patrida PT [facility]   3/26/2019 12:00 PM Juvenal Meng [facility]   3/28/2019 10:30 AM Amanuel Partida PT [facility]   4/2/2019 10:30 AM Amanuel Partida PT [facility]   4/4/2019 10:30 AM Juvenal Meng [facility]   4/9/2019 10:30 AM Juvenal Meng [facility]   4/11/2019 10:30 AM [facility]

## 2019-03-19 ENCOUNTER — HOSPITAL ENCOUNTER (OUTPATIENT)
Dept: PHYSICAL THERAPY | Age: 84
Discharge: HOME | End: 2019-03-19
Payer: MEDICARE

## 2019-03-19 PROCEDURE — 97110 THERAPEUTIC EXERCISES: CPT

## 2019-03-19 PROCEDURE — 97140 MANUAL THERAPY 1/> REGIONS: CPT

## 2019-03-19 NOTE — PROGRESS NOTES
PHYSICAL THERAPY - DAILY TREATMENT NOTE Patient Name: Stephenie Orourke        Date: 3/19/2019 : 1935   YES Patient  Verified Visit #:     Insurance: Payor: Soraida Akers / Plan: [address] [place] [address] HMO / Product Type: Managed Care Medicare / In time: 12:00P Out time: 1:00P Total Treatment Time: 60  
 
BCBS/Medicare Time Tracking (below) Total Timed Codes (min):  50 1:1 Treatment Time:  40 TREATMENT AREA =  Knee pain, right [M25.561] SUBJECTIVE Pain Level (on 0 to 10 scale):  0  / 10 Medication Changes/New allergies or changes in medical history, any new surgeries or procedures? NO    If yes, update Summary List  
Subjective Functional Status/Changes:  []  No changes reported \"I am doing okay, I feel like my knee is tight today. I didn't have a chance to wear the brace yet today. \"  Pt reports wearing dynasplint 1.5 hours a day x 2. Modalities Rationale:     decrease inflammation and decrease pain to improve patient's ability to perform unlimited ADLs  
 min [] Estim, type/location:   
                                 []  att     []  unatt     []  w/US     []  w/ice    []  w/heat 
 min []  Mechanical Traction: type/lbs   
               []  pro   []  sup   []  int   []  cont    []  before manual    []  after manual  
 min []  Ultrasound, settings/location:    
 min []  Iontophoresis w/ dexamethasone, location:   
                                           []  take home patch       []  in clinic  
10 min [x]  Ice     []  Heat    location/position: R knee in supine   
 min []  Vasopneumatic Device, press/temp:   
 min []  Other:   
[] Skin assessment post-treatment (if applicable):   
[]  intact    []  redness- no adverse reaction    
[]redness  adverse reaction:     
35/25 min Therapeutic Exercise:  [x]  See flow sheet Rationale:      increase ROM, increase strength, improve coordination, improve balance and increase proprioception to improve the patients ability to perform unlimited ADLs 15 min Manual Therapy: PROM of the R knee into flexion in supine, contract relax In prone, grade 1-4 posterior glides of tibia into flexion Rationale:      decrease pain, increase ROM and increase tissue extensibility to improve patient's ability to perform unlimited stair negoation Billed With/As: 
 [] TE 
 [] TA 
 [] Neuro 
 [] Self Care Patient Education: [x] Review HEP [] Progressed/Changed HEP based on:  
[] positioning   [] body mechanics   [] transfers   [] heat/ice application   
[] other:   
Other Objective/Functional Measures: 
 
Progressed to 8\" step up Added standing chair quad stretch Post Treatment Pain Level (on 0 to 10) scale:   0  / 10 ASSESSMENT Assessment/Changes in Function: No change in overall flexion today, 0-105 degrees with ERP, possibly due to patient not wearing dyansplint this AM.  
  
[]  See Progress Note/Recertification Patient will continue to benefit from skilled PT services to modify and progress therapeutic interventions, address functional mobility deficits, address ROM deficits, address strength deficits, analyze and address soft tissue restrictions, analyze and cue movement patterns, analyze and modify body mechanics/ergonomics, assess and modify postural abnormalities, address imbalance/dizziness and instruct in home and community integration to attain remaining goals. Progress toward goals / Updated goals: 
Slow progress towards ROM goals. PLAN [x]  Upgrade activities as tolerated YES Continue plan of care  
[]  Discharge due to :   
[]  Other:   
 
Therapist: Mague Potter Date: 3/19/2019 Time: 2:11 PM  
 
Future Appointments Date Time Provider Angela Tao 3/21/2019 10:00 AM Angelia Heaton, PT [facility]  
3/26/2019 12:00 PM [facility]  
3/28/2019 10:30 AM Castro Quispe, [facility]  
 4/2/2019 10:30 AM Kasey Stallings PT [facility]  
4/4/2019 10:30 AM Danitza Meng [facility]  
4/[facility]/2019 10:30 [facility]  
4/11/2019 10:30 AM Irene [facility]

## 2019-03-21 ENCOUNTER — HOSPITAL ENCOUNTER (OUTPATIENT)
Dept: PHYSICAL THERAPY | Age: 84
Discharge: HOME | End: 2019-03-21
Payer: MEDICARE

## 2019-03-21 PROCEDURE — 97140 MANUAL THERAPY 1/> REGIONS: CPT

## 2019-03-21 PROCEDURE — 97110 THERAPEUTIC EXERCISES: CPT

## 2019-03-21 NOTE — PROGRESS NOTES
PHYSICAL THERAPY - DAILY TREATMENT NOTE Patient Name: Nani Kelley        Date: 3/21/2019 : 1935   YES Patient  Verified Visit #:   10   of   12  Insurance: Payor: Adia Naranjo / Plan: [address] [place] [address] HMO / Product Type: Managed Care Medicare / In time: 10 A Out time: 11 A Total Treatment Time: 55  
 
BCBS/Medicare Time Tracking (below) Total Timed Codes (min):  45 1:1 Treatment Time:  40 TREATMENT AREA =  Knee pain, right [M25.561] SUBJECTIVE Pain Level (on 0 to 10 scale):  0  / 10 Medication Changes/New allergies or changes in medical history, any new surgeries or procedures? NO    If yes, update Summary List  
Subjective Functional Status/Changes:  []  No changes reported See re-cert to MD   
 
  
 
 Modalities Rationale:     decrease pain to improve patient's ability to return to pain-free standing 
 min [] Estim, type/location:   
                                 []  att     []  unatt     []  w/US     []  w/ice    []  w/heat 
 min []  Mechanical Traction: type/lbs   
               []  pro   []  sup   []  int   []  cont    []  before manual    []  after manual  
 min []  Ultrasound, settings/location:    
 min []  Iontophoresis w/ dexamethasone, location:   
                                           []  take home patch       []  in clinic  
10 min [x]  Ice     []  Heat    location/position: Supine to L hip   
 min []  Vasopneumatic Device, press/temp:   
 min []  Other:   
[] Skin assessment post-treatment (if applicable):   
[]  intact    []  redness- no adverse reaction    
[]redness  adverse reaction:     
30/ 
10 min Therapeutic Exercise:  [x]  See flow sheet Rationale:      increase ROM and increase strength to improve the patients ability to return to rec stair negotiation 15 min Manual Therapy: Gentle manual stretch for flexion in supine f/b CR flexion Rationale:      increase ROM to improve patient's ability to return to indep dressing Billed With/As: 
 [] TE 
 [] TA 
 [] Neuro 
 [] Self Care Patient Education: [x] Review HEP [] Progressed/Changed HEP based on:  
[] positioning   [] body mechanics   [] transfers   [] heat/ice application   
[] other:   
Other Objective/Functional Measures: FOTO 60 PROM: 0-110 deg in supine Post Treatment Pain Level (on 0 to 10) scale:   0  / 10 ASSESSMENT Assessment/Changes in Function:  
 
Steady progress with flexion mobility & tolerance to self stretche  
[]  See Progress Note/Recertification Patient will continue to benefit from skilled PT services to modify and progress therapeutic interventions, address functional mobility deficits, address ROM deficits, address strength deficits, address imbalance/dizziness and instruct in home and community integration to attain remaining goals. Progress toward goals / Updated goals: 
Progressing towards LTG 2, LTG 1, 3 met PLAN 
[]  Upgrade activities as tolerated YES Continue plan of care  
[]  Discharge due to :   
[x]  Other: Issued copy of PN for f/u with MD  
 
Therapist: uGstavo Nunez PT Date: 3/21/2019 Time: 7:37 AM  
 
Future Appointments Date Time Provider Angela Tao 3/21/2019 10:00 AM Lisa Ovalle PT [facility]  
[facility]2019 [facility]  
3/28/2019 10:30 AM Lisa Ovalle PT [facility]  
4/2/2019 10:30 AM Lisa Ovalle PT [facility]  
4/4/2019 10:30 AM [facility]  
4/9/2019 10:30 AM [facility]  
4/11/2019 10:30 AM Sherryle Pinta [facility]

## 2019-03-21 NOTE — PROGRESS NOTES
Renny Barnard 32  [facility]  [facility] 
Roger Dior [facility], [address] ,[address], [address]  Phone: [phone]  Fax: [phone] CONTINUED PLAN OF CARE/RECERTIFICATION FOR PHYSICAL THERAPY Patient Name: Adi Chaudhary : 1935 Treatment/Medical Diagnosis: Knee pain, right [M25.561] Onset Date: 19 Referral Source: Kenneth Sr MD [place] [facility]): 19 Prior Hospitalization: See Medical History Provider #: [id number] Prior Level of Function: Manageable sx with ADLs, fitness program at recreational center 3 days/walk, daily walking program, amb without AD Comorbidities: H/o HTN, L THR  Medications: Verified on Patient Summary List  
Visits from [facility]: 10 Missed Visits: 0 Goal/Measure of Progress Goal Met? 1. Improve FOTO score from 53 points to > or = 58 points indicating improved tolerance with ADLs in regards to R knee. Status at last Eval: 53 Current Status: 60 yes 2. Patient will improve R knee AROM to 0-110 degrees so ROM is available for dressing activities.    
Status at last Eval: 3-103 deg Current Status: PROM 0-110 deg in supine Partially met 3. Patient will be able to negotiate multiple steps using 1 HR & reciprocal pattern with no safety concerns noted.    
Status at last Eval: unable Current Status: 1 HR, rec pattern yes Key Functional Changes/Progress: Ms. Claudell Estelle continues to make steady progress with PT & generally reports no c/o pain with intermittent c/o pain in R knee with self stretches, pain level at worst 7-8/10 although her tolerance to self stretches has improved greatly. She reports taking tylenol at night prn. She is currently using Dynasplint brace 1.5 hours, two times/day at level 8 (out of 9) level of stretch. V/c to encourage TKE with ambulation, although she is able to achieve full extension passively.  
Problem List: pain affecting function, decrease ROM, decrease strength, edema affecting function, impaired gait/ balance, decrease ADL/ functional abilitiies, decrease activity tolerance, decrease flexibility/ joint mobility and decrease transfer abilities Treatment Plan may include any combination of the following: Therapeutic exercise, Therapeutic activities, Neuromuscular re-education, Physical agent/modality, Gait/balance training, Manual therapy, Aquatic therapy, Patient education, Self Care training, Functional mobility training, Home safety training and Stair training Patient Goal(s) has been updated and includes:  \"less pain & better motion with knee\" ? Goals for this certification period include and are to be achieved in   4  weeks: 
1) Improve FOTO score from 60 points to > or = 63 points indicating improved tolerance with ADLs in regards to R knee. 2) Patient to be able to perform FWD step down from 4-6 inch step with good pelvis/knee stability & no c/o pain in preparation for return to R knee. 3) Improve overall strength of R knee to 5-/5 with no c/o pain upon MMT so strength is available for return to pain-free squatting. 4) Patient will improve R knee AROM to 0-110 degrees so ROM is available for dressing activities. Frequency / Duration:   Patient to be seen   2   times per week for   4    weeks: 
 
Assessments/Recommendations: Patient to continue with PT for up to 3-4 more weeks in order to progress towards achieving all LTGs. If you have any questions/comments please contact us directly at [phone]. Thank you for allowing us to assist in the care of your patient. Therapist Signature: AIME Tan, milton MDT Date: 1/34/1460 Certification Period: 
Reporting Period: 3-21-19 to 6-19-19 
3-08-19 to 3-21-19 Time: 7:37 AM  
NOTE TO PHYSICIAN:  PLEASE COMPLETE THE ORDERS BELOW AND FAX TO [facility]: [phone]. If you are unable to process this request in 24 hours please contact our office: [phone]. ___ I have read the above report and request that my patient continue as recommended.  
___ I have read the above report and request that my patient continue therapy with the following changes/special instructions: ________________________________________________  
___ I have read the above report and request that my patient be discharged from therapy.   
 
Physician Signature:       Date:      Time:

## 2019-03-26 ENCOUNTER — HOSPITAL ENCOUNTER (OUTPATIENT)
Dept: PHYSICAL THERAPY | Age: 84
Discharge: HOME | End: 2019-03-26
Payer: MEDICARE

## 2019-03-26 PROCEDURE — 97110 THERAPEUTIC EXERCISES: CPT

## 2019-03-26 PROCEDURE — 97140 MANUAL THERAPY 1/> REGIONS: CPT

## 2019-03-26 NOTE — PROGRESS NOTES
PHYSICAL THERAPY - DAILY TREATMENT NOTE Patient Name: Hershell Scheuermann        Date: 3/26/2019 : 1935   YES Patient  Verified Visit #:   74   of   20  Insurance: Payor: Mary Kay Albarran / Plan: [address] [place] [address] HMO / Product Type: Managed Care Medicare / In time: 12:00P Out time: 1:00P Total Treatment Time: 60  
 
BCBS/Medicare Time Tracking (below) Total Timed Codes (min):  50 1:1 Treatment Time:  25 TREATMENT AREA =  Knee pain, right [M25.561] SUBJECTIVE Pain Level (on 0 to 10 scale):  0  / 10 Medication Changes/New allergies or changes in medical history, any new surgeries or procedures? NO    If yes, update Summary List  
Subjective Functional Status/Changes:  []  No changes reported \"I am doing good the doctor wants to see me in four weeks. \"    
 
  
 
 Modalities Rationale:     decrease inflammation and decrease pain to improve patient's ability to perform unlimited ADLs  
 min [] Estim, type/location:   
                                 []  att     []  unatt     []  w/US     []  w/ice    []  w/heat 
 min []  Mechanical Traction: type/lbs   
               []  pro   []  sup   []  int   []  cont    []  before manual    []  after manual  
 min []  Ultrasound, settings/location:    
 min []  Iontophoresis w/ dexamethasone, location:   
                                           []  take home patch       []  in clinic  
10 min [x]  Ice     []  Heat    location/position: Supine to R knee   
 min []  Vasopneumatic Device, press/temp:   
 min []  Other:   
[] Skin assessment post-treatment (if applicable):   
[]  intact    []  redness- no adverse reaction    
[]redness  adverse reaction:     
40/15 min Therapeutic Exercise:  [x]  See flow sheet Rationale:      increase ROM, increase strength, improve coordination, improve balance and increase proprioception to improve the patients ability to perform unlimited ADLs 10 min Manual Therapy: PROM into flexion in supine, contract relax in prone Rationale:      decrease pain, increase ROM and increase tissue extensibility to improve patient's ability to perform unlimited stair negotation Billed With/As: 
 [] TE 
 [] TA 
 [] Neuro 
 [] Self Care Patient Education: [x] Review HEP [] Progressed/Changed HEP based on:  
[] positioning   [] body mechanics   [] transfers   [] heat/ice application   
[] other:   
Other Objective/Functional Measures: 
 
Performed rec bike S 4 for 1' progressed to S 3 for 4' Post Treatment Pain Level (on 0 to 10) scale:   0  / 10 ASSESSMENT Assessment/Changes in Function:  
 
Patient is making gradual progress with PT rx continues ot have tightness into flexion 
  
[]  See Progress Note/Recertification Patient will continue to benefit from skilled PT services to modify and progress therapeutic interventions, address functional mobility deficits, address ROM deficits, address strength deficits, analyze and address soft tissue restrictions, analyze and cue movement patterns, analyze and modify body mechanics/ergonomics, assess and modify postural abnormalities, address imbalance/dizziness and instruct in home and community integration to attain remaining goals. Progress toward goals / Updated goals: 
Progressing towards ROM goals. PLAN [x]  Upgrade activities as tolerated YES Continue plan of care  
[]  Discharge due to :   
[]  Other:   
 
Therapist: Gabi Ordonez Date: 3/26/2019 Time: 2:39 PM  
 
Future Appointments Date Time Provider Angela Tao 3/28/2019 10:30 AM Enedelia Arzola PT [facility]  
4/2/2019 10:30 AM Enedelia Arzola PT [facility]  
4/4/2019 10:30 AM Kareen Cummings [facility]  
4/9/2019 10:30 AM Emelia Prader [facility]  
4/11/2019 10:30 AM Emelia Prader [facility]

## 2019-03-28 ENCOUNTER — HOSPITAL ENCOUNTER (OUTPATIENT)
Dept: PHYSICAL THERAPY | Age: 84
Discharge: HOME | End: 2019-03-28
Payer: MEDICARE

## 2019-03-28 PROCEDURE — 97110 THERAPEUTIC EXERCISES: CPT

## 2019-04-02 ENCOUNTER — APPOINTMENT (OUTPATIENT)
Dept: PHYSICAL THERAPY | Age: 84
End: 2019-04-02
Payer: MEDICARE

## 2019-04-04 ENCOUNTER — HOSPITAL ENCOUNTER (OUTPATIENT)
Dept: PHYSICAL THERAPY | Age: 84
Discharge: HOME | End: 2019-04-04
Payer: MEDICARE

## 2019-04-04 PROCEDURE — 97140 MANUAL THERAPY 1/> REGIONS: CPT

## 2019-04-04 PROCEDURE — 97110 THERAPEUTIC EXERCISES: CPT

## 2019-04-04 NOTE — PROGRESS NOTES
PHYSICAL THERAPY - DAILY TREATMENT NOTE Patient Name: Harleen Comes        Date: 2019 : 1935   YES Patient  Verified Visit #:   15   of   20  Insurance: Payor: Tiera Colon / Plan: [address] [place] [address] HMO / Product Type: Managed Care Medicare / In time: 10:30A Out time: 11:30A Total Treatment Time: 60  
 
BCBS/Medicare Time Tracking (below) Total Timed Codes (min):  50 1:1 Treatment Time:  25 TREATMENT AREA =  Knee pain, right [M25.561] SUBJECTIVE Pain Level (on 0 to 10 scale):  0  / 10 Medication Changes/New allergies or changes in medical history, any new surgeries or procedures? NO    If yes, update Summary List  
Subjective Functional Status/Changes:  []  No changes reported \"I am doing pretty good\"  Patient reports that she in the hospital from -Tuesday due to stomach pain. Shre ports that this has happened before and she has to use IV food/fluids and then progress to liquid only diet due to scar tissue. She report sthat she is feeling beter but just weak. Modalities Rationale:     decrease inflammation and decrease pain to improve patient's ability to perform unlimited ADLs 
 min [] Estim, type/location:   
                                 []  att     []  unatt     []  w/US     []  w/ice    []  w/heat 
 min []  Mechanical Traction: type/lbs   
               []  pro   []  sup   []  int   []  cont    []  before manual    []  after manual  
 min []  Ultrasound, settings/location:    
 min []  Iontophoresis w/ dexamethasone, location:   
                                           []  take home patch       []  in clinic  
10 min [x]  Ice     []  Heat    location/position: R knee in supine   
 min []  Vasopneumatic Device, press/temp:   
 min []  Other:   
[] Skin assessment post-treatment (if applicable):   
[]  intact    []  redness- no adverse reaction    
[]redness  adverse reaction: 40/ 15 min Therapeutic Exercise:  [x]  See flow sheet Rationale:      increase ROM, increase strength, improve coordination, improve balance and increase proprioception to improve the patients ability to perform unlimited ADLs 10 min Manual Therapy: Gentle PROM into flexion in supine ; patellar mobilizations. Rationale:      decrease pain, increase ROM and increase tissue extensibility to improve patient's ability to perform unlimited ADLs Billed With/As: 
 [] TE 
 [] TA 
 [] Neuro 
 [] Self Care Patient Education: [x] Review HEP [] Progressed/Changed HEP based on:  
[] positioning   [] body mechanics   [] transfers   [] heat/ice application   
[] other:   
Other Objective/Functional Measures: 
 
AAROM into flexion 0-105 degrees Post Treatment Pain Level (on 0 to 10) scale:   0  / 10 ASSESSMENT Assessment/Changes in Function:  
 
Patient had notable increase in overall stiffness today, possibly due to bed rest over the weekend and inability to perform stretches. []  See Progress Note/Recertification Patient will continue to benefit from skilled PT services to modify and progress therapeutic interventions, address functional mobility deficits, address ROM deficits, address strength deficits, analyze and address soft tissue restrictions, analyze and cue movement patterns, analyze and modify body mechanics/ergonomics, assess and modify postural abnormalities, address imbalance/dizziness and instruct in home and community integration to attain remaining goals. Progress toward goals / Updated goals: 
Progressing towards LTG 2. PLAN [x]  Upgrade activities as tolerated YES Continue plan of care  
[]  Discharge due to :   
[]  Other:   
 
Therapist: Ann Marie House Date: 4/4/2019 Time: 1:46 PM  
 
Future Appointments Date Time Provider Angela Tao 4/9/2019 10:30 AM [place] [address]  
4/11/2019 10:30 AM Cholo Lim [address] [place] [address]  
 4/16/2019 10:30 AM Eboni Price [facility]  
4/18/2019 10:30 AM Eboni Price [facility]  
4/23/2019 10:30 AM Vidal Salinas, PT [facility]  
4/25/2019 10:30 AM Funmi Quispe, PT [facility]

## 2019-04-09 ENCOUNTER — HOSPITAL ENCOUNTER (OUTPATIENT)
Dept: PHYSICAL THERAPY | Age: 84
Discharge: HOME | End: 2019-04-09
Payer: MEDICARE

## 2019-04-09 PROCEDURE — 97140 MANUAL THERAPY 1/> REGIONS: CPT

## 2019-04-09 PROCEDURE — 97110 THERAPEUTIC EXERCISES: CPT

## 2019-04-09 NOTE — PROGRESS NOTES
PHYSICAL THERAPY - DAILY TREATMENT NOTE Patient Name: Tavon Parker        Date: 2019 : 1935   YES Patient  Verified Visit #:     Insurance: Payor: Larry Mazariegos / Plan: [address] [place] [address] HMO / Product Type: Managed Care Medicare / In time: 10:30A Out time: 11:40A Total Treatment Time: 70 BCBS/Medicare Time Tracking (below) Total Timed Codes (min):  60 1:1 Treatment Time:  30 TREATMENT AREA =  Knee pain, right [M25.561] SUBJECTIVE Pain Level (on 0 to 10 scale):  0  / 10 Medication Changes/New allergies or changes in medical history, any new surgeries or procedures? NO    If yes, update Summary List  
Subjective Functional Status/Changes:  []  No changes reported \"I am doing good my knee always is getting stiff after I sit too long or ice\" Modalities Rationale:     decrease inflammation and decrease pain to improve patient's ability to perform unlimited ambulation 
 min [] Estim, type/location:   
                                 []  att     []  unatt     []  w/US     []  w/ice    []  w/heat 
 min []  Mechanical Traction: type/lbs   
               []  pro   []  sup   []  int   []  cont    []  before manual    []  after manual  
 min []  Ultrasound, settings/location:    
 min []  Iontophoresis w/ dexamethasone, location:   
                                           []  take home patch       []  in clinic  
10 min [x]  Ice     []  Heat    location/position: R knee in supine   
 min []  Vasopneumatic Device, press/temp:   
 min []  Other:   
[] Skin assessment post-treatment (if applicable):   
[]  intact    []  redness- no adverse reaction    
[]redness  adverse reaction:     
50/20 min Therapeutic Exercise:  [x]  See flow sheet Rationale:      increase ROM, increase strength, improve coordination, improve balance and increase proprioception to improve the patients ability to perform unlimited ADLs 10 min Manual Therapy: PROM flexion in supine and CR in prone with quadriceps stretch Rationale:      decrease pain, increase ROM and increase tissue extensibility to improve patient's ability to perform unlimited ADLs Billed With/As: 
 [] TE 
 [] TA 
 [] Neuro 
 [] Self Care Patient Education: [x] Review HEP [] Progressed/Changed HEP based on:  
[] positioning   [] body mechanics   [] transfers   [] heat/ice application   
[] other:   
Other Objective/Functional Measures: 
 
AAROM into flexion 108 degrees with over pressure Post Treatment Pain Level (on 0 to 10) scale:   0  / 10 ASSESSMENT Assessment/Changes in Function:  
 
Patient is making slow progress with PT rx with minimal mendiola over in AAROM gains made in PT she was encouraged to ride rec bike within the home due to poor ability to perform heel slides. []  See Progress Note/Recertification Patient will continue to benefit from skilled PT services to modify and progress therapeutic interventions, address functional mobility deficits, address ROM deficits, address strength deficits, analyze and address soft tissue restrictions, analyze and cue movement patterns, analyze and modify body mechanics/ergonomics, assess and modify postural abnormalities, address imbalance/dizziness and instruct in home and community integration to attain remaining goals. Progress toward goals / Updated goals: 
Progressing towards AAROm goals. PLAN [x]  Upgrade activities as tolerated YES Continue plan of care  
[]  Discharge due to :   
[]  Other:   
 
Therapist: Jessie Lantigua Date: 4/9/2019 Time: 12:53 PM  
 
Future Appointments Date Time Provider Angela Tao [facility]11[facility]2019 10[facility]  
4/16/2019 10:30 AM [facility]  
4/18/2019 10:30 [facility]  
4/23/2019 10:30 AM Milan Morris, PT [facility]  
4/25/2019 10:30 AM Alcira Quispe, PT [facility]

## 2019-04-11 ENCOUNTER — HOSPITAL ENCOUNTER (OUTPATIENT)
Dept: PHYSICAL THERAPY | Age: 84
Discharge: HOME | End: 2019-04-11
Payer: MEDICARE

## 2019-04-11 PROCEDURE — 97110 THERAPEUTIC EXERCISES: CPT

## 2019-04-11 PROCEDURE — 97140 MANUAL THERAPY 1/> REGIONS: CPT

## 2019-04-11 NOTE — PROGRESS NOTES
PHYSICAL THERAPY - DAILY TREATMENT NOTE Patient Name: Sonal Foley        Date: 2019 : 1935   YES Patient  Verified Visit #:   15   of   20  Insurance: Payor: Peyman To / Plan: [address] [place] [address] HMO / Product Type: Managed Care Medicare / In time: 10:20A Out time: 11:30A Total Treatment Time: 70 BCBS/Medicare Time Tracking (below) Total Timed Codes (min):  60 1:1 Treatment Time:  40 TREATMENT AREA =  Knee pain, right [M25.561] SUBJECTIVE Pain Level (on 0 to 10 scale):  0  / 10 Medication Changes/New allergies or changes in medical history, any new surgeries or procedures? NO    If yes, update Summary List  
Subjective Functional Status/Changes:  []  No changes reported \"I am doing good my knee just feels stiff. \"   
 
  
 
 Modalities Rationale:     decrease inflammation and decrease pain to improve patient's ability to perform unlimited ADLs  
 min [] Estim, type/location:   
                                 []  att     []  unatt     []  w/US     []  w/ice    []  w/heat 
 min []  Mechanical Traction: type/lbs   
               []  pro   []  sup   []  int   []  cont    []  before manual    []  after manual  
 min []  Ultrasound, settings/location:    
 min []  Iontophoresis w/ dexamethasone, location:   
                                           []  take home patch       []  in clinic  
10 min [x]  Ice     []  Heat    location/position: R knee in supine   
 min []  Vasopneumatic Device, press/temp:   
 min []  Other:   
[] Skin assessment post-treatment (if applicable):   
[]  intact    []  redness- no adverse reaction    
[]redness  adverse reaction:     
50/30 min Therapeutic Exercise:  [x]  See flow sheet Rationale:      increase ROM, increase strength, improve coordination, improve balance and increase proprioception to improve the patients ability to perform unlimited ADLs 10 min Manual Therapy: OP into extension, CR in prone, PROM into flexion in supine Rationale:      decrease pain, increase ROM and increase tissue extensibility to improve patient's ability to perform unlimited ADLs Billed With/As: 
 [] TE 
 [] TA 
 [] Neuro 
 [] Self Care Patient Education: [x] Review HEP [] Progressed/Changed HEP based on:  
[] positioning   [] body mechanics   [] transfers   [] heat/ice application   
[] other:   
Other Objective/Functional Measures: 
 
AAROM into flexion 110 degrees with OP. Post Treatment Pain Level (on 0 to 10) scale:   0  / 10 ASSESSMENT Assessment/Changes in Function:  
 
Progressing well towards goal, she has difficulty achieving full extension. []  See Progress Note/Recertification Patient will continue to benefit from skilled PT services to modify and progress therapeutic interventions, address functional mobility deficits, address ROM deficits, address strength deficits, analyze and address soft tissue restrictions, analyze and cue movement patterns, analyze and modify body mechanics/ergonomics, assess and modify postural abnormalities, address imbalance/dizziness and instruct in home and community integration to attain remaining goals. Progress toward goals / Updated goals: 
Progressing towards LTG 2. PLAN [x]  Upgrade activities as tolerated YES Continue plan of care  
[]  Discharge due to :   
[]  Other:   
 
Therapist: Jessie Lantigua Date: 4/11/2019 Time: 1:19 PM  
 
Future Appointments Date Time Provider Angela Tao 4/16/2019 10:30 AM Jazzmine Gold [facility]  
4/18/2019 10:30 AM Jazzmine Gold [facility]  
4/23/2019 10:30 AM Milan Morris, PT [facility]  
4/25/2019 10:30 AM Alcira Quispe, PT [facility]

## 2019-04-16 ENCOUNTER — HOSPITAL ENCOUNTER (OUTPATIENT)
Dept: PHYSICAL THERAPY | Age: 84
Discharge: HOME | End: 2019-04-16
Payer: MEDICARE

## 2019-04-16 PROCEDURE — 97140 MANUAL THERAPY 1/> REGIONS: CPT

## 2019-04-16 PROCEDURE — 97110 THERAPEUTIC EXERCISES: CPT

## 2019-04-17 NOTE — PROGRESS NOTES
PHYSICAL THERAPY - DAILY TREATMENT NOTE Patient Name: Bianca Cazares        Date: 2019 : 1935   YES Patient  Verified Visit #:     Insurance: Payor: Jose Gauze / Plan: [address] [place] [address] HMO / Product Type: Managed Care Medicare / In time: - Out time: - Total Treatment Time: - BCBS/Medicare Time Tracking (below) Total Timed Codes (min):  - 1:1 Treatment Time:  -  
TREATMENT AREA =  Knee pain, right [M25.561] SUBJECTIVE Pain Level (on 0 to 10 scale):  -  / 10 Medication Changes/New allergies or changes in medical history, any new surgeries or procedures? NO    If yes, update Summary List  
Subjective Functional Status/Changes:  []  No changes reported CONNECTCARE DOWN SEE PAPER CHART. Modalities Rationale:    
 min [] Estim, type/location:    
                                 []  att     []  unatt     []  w/US     []  w/ice    []  w/heat  
 min []  Mechanical Traction: type/lbs   
               []  pro   []  sup   []  int   []  cont    []  before manual    []  after manual  
 min []  Ultrasound, settings/location:    
 min []  Iontophoresis w/ dexamethasone, location:   
                                           []  take home patch       []  in clinic  
 min []  Ice     []  Heat    location/position:   
 min []  Vasopneumatic Device, press/temp:   
 min []  Other:   
[] Skin assessment post-treatment (if applicable):   
[]  intact    []  redness- no adverse reaction    
[]redness  adverse reaction:     
 
 min Therapeutic Exercise:  [x]  See flow sheet  
 min Manual Therapy:   
 
 min Therapeutic Activity: [x]  See flow sheet  
 min Neuromuscular Re-ed: [x]  See flow sheet  
 min Gait Training:  ___ feet with ___ device on level surfaces with ___ level of assistance Billed With/As: 
 [] TE 
 [] TA 
 [] Neuro 
 [] Self Care Patient Education: [x] Review HEP [] Progressed/Changed HEP based on: [] positioning   [] body mechanics   [] transfers   [] heat/ice application   
[] other:   
Other Objective/Functional Measures: CONNECT CARE DOWN SEE PAPER CHART Post Treatment Pain Level (on 0 to 10) scale:   -  / 10 ASSESSMENT Assessment/Changes in Function: CONNECT CARE DOWN SEE PAPER CHART 
  
[]  See Progress Note/Recertification Patient will continue to benefit from skilled PT services to modify and progress therapeutic interventions, address functional mobility deficits, address ROM deficits, address strength deficits, analyze and address soft tissue restrictions, analyze and cue movement patterns, analyze and modify body mechanics/ergonomics, assess and modify postural abnormalities, address imbalance/dizziness and instruct in home and community integration to attain remaining goals. Progress toward goals / Updated goals: CONNECT CARE DOWN SEE PAPER CHART  
 
PLAN 
[]  Upgrade activities as tolerated YES Continue plan of care  
[]  Discharge due to :   
[]  Other:   
 
Therapist: Ayesha Irwin Date: 4/17/2019 Time: 10:26 AM  
 
Future Appointments Date Time Provider Angela Tao 4/18/2019 10:30 AM [facility]  
4/23/2019 10:30 AM Kiley Sky, PT [facility]  
4/25/2019 10:30 AM Ryan Quispe, PT [facility]

## 2019-04-18 ENCOUNTER — HOSPITAL ENCOUNTER (OUTPATIENT)
Dept: PHYSICAL THERAPY | Age: 84
Discharge: HOME | End: 2019-04-18
Payer: MEDICARE

## 2019-04-18 PROCEDURE — 97110 THERAPEUTIC EXERCISES: CPT

## 2019-04-18 PROCEDURE — 97140 MANUAL THERAPY 1/> REGIONS: CPT

## 2019-04-18 NOTE — PROGRESS NOTES
PHYSICAL THERAPY - DAILY TREATMENT NOTE Patient Name: Ashleigh Pérez        Date: 2019 : 1935   YES Patient  Verified Visit #:   Insurance: Payor: Tatum Carlos / Plan: [address] [place] [address] HMO / Product Type: Managed Care Medicare / In time: 10:30A Out time: 11:30 Total Treatment Time: 60  
 
BCBS/Medicare Time Tracking (below) Total Timed Codes (min):  50 1:1 Treatment Time:  40 TREATMENT AREA =  Knee pain, right [M25.561] SUBJECTIVE Pain Level (on 0 to 10 scale):  0  / 10 Medication Changes/New allergies or changes in medical history, any new surgeries or procedures? NO    If yes, update Summary List  
Subjective Functional Status/Changes:  []  No changes reported Notable improvements in overall edema and swelling with use of compression wrap throughout this week since LV. Patient reports mowing her lawn yesterday and having some difficulty with overall balance. Modalities Rationale:     decrease edema, decrease inflammation and decrease pain to improve patient's ability to perform unlimited dressing 
 min [] Estim, type/location:   
                                 []  att     []  unatt     []  w/US     []  w/ice    []  w/heat 
 min []  Mechanical Traction: type/lbs   
               []  pro   []  sup   []  int   []  cont    []  before manual    []  after manual  
 min []  Ultrasound, settings/location:    
 min []  Iontophoresis w/ dexamethasone, location:   
                                           []  take home patch       []  in clinic  
10 min [x]  Ice     []  Heat    location/position: R knee in supine  
 min []  Vasopneumatic Device, press/temp:   
 min []  Other:   
[] Skin assessment post-treatment (if applicable):   
[]  intact    []  redness- no adverse reaction    
[]redness  adverse reaction:     
40/30 min Therapeutic Exercise:  [x]  See flow sheet Rationale:      increase ROM, increase strength, improve coordination, improve balance and increase proprioception to improve the patients ability to perform unlimited house work. 10 min Manual Therapy: Prom into flexion in supine, patellar mobilizations, OP into extension Rationale:  Increase ROM in order to improve ease with transfers Billed With/As: 
 [] TE 
 [] TA 
 [] Neuro 
 [] Self Care Patient Education: [x] Review HEP [] Progressed/Changed HEP based on:  
[] positioning   [] body mechanics   [] transfers   [] heat/ice application   
[] other:   
Other Objective/Functional Measures: Added tandem balance on foam 
 
AAROm into flexion today: 110 with ERP requiring manual assist.  
 
 
  
Post Treatment Pain Level (on 0 to 10) scale:   0  / 10 ASSESSMENT Assessment/Changes in Function:  
 
Patient continues to have increased stiffness throughout the R knee which could be attributed to increased fluid throughout the R knee. []  See Progress Note/Recertification Patient will continue to benefit from skilled PT services to modify and progress therapeutic interventions, address functional mobility deficits, address ROM deficits, address strength deficits, analyze and address soft tissue restrictions, analyze and cue movement patterns, analyze and modify body mechanics/ergonomics, assess and modify postural abnormalities, address imbalance/dizziness and instruct in home and community integration to attain remaining goals. Progress toward goals / Updated goals: 
Steady progress towards ROM goals. PLAN [x]  Upgrade activities as tolerated YES Continue plan of care  
[]  Discharge due to :   
[]  Other:   
 
Therapist: Heidy Diaz Date: 4/18/2019 Time: 11:14 AM  
 
Future Appointments Date Time Provider Angela Tao 4/23/2019 10:30 AM Percy Gates, PT [facility]  
4/25/2019 10:30 AM Demi Quispe, PT [facility]

## 2019-04-23 ENCOUNTER — HOSPITAL ENCOUNTER (OUTPATIENT)
Dept: PHYSICAL THERAPY | Age: 84
Discharge: HOME | End: 2019-04-23
Payer: MEDICARE

## 2019-04-23 PROCEDURE — 97110 THERAPEUTIC EXERCISES: CPT

## 2019-04-23 NOTE — PROGRESS NOTES
Renny Barnard [address]  [facility]  [facility] 
[facility] [address], [address] ,[address], [address]  Phone: [phone]  Fax: [phone] DISCHARGE SUMMARY FOR PHYSICAL THERAPY Patient Name: Demian Sue : 1935 Treatment/Medical Diagnosis: Knee pain, right [M25.561] Onset Date: 19 Referral Source: Juju Crump MD Start of Care [facility]): 19 Prior Hospitalization: See Medical History Provider #: [id number] Prior Level of Function: Manageable sx with ADLs, fitness program at recreational center 3 days/walk, daily walking program, amb without AD Comorbidities: H/o HTN, L THR  Medications: Verified on Patient Summary List  
Visits from [facility]: 18 Missed Visits: 0 Goal/Measure of Progress Goal Met? 1. Improve FOTO score from 60 points to > or = 63 points indicating improved tolerance with ADLs in regards to R knee. Status at last Eval: 60 Current Status: 78 yes 2. Patient will improve R knee AROM to 0-110 degrees so ROM is available for dressing activities.    
Status at last Eval: PROM 0-110 deg in supine Current Status: PROM 0-110 deg in supine,  
105 deg flexion actively Partially met 3. Improve overall strength of R knee to 5-/5 with no c/o pain upon MMT so strength is available for return to pain-free squatting. Status at last Eval: NA Current Status: Overall strength 5- to 5/5 with no c/o pain upon MMT yes Key Functional Changes/Progress: Ms. Woodrow Hashimoto continues to make steady progress with PT & generally reports no c/o pain with intermittent c/o pain in R knee with self stretches, pain level at worst 5/10 typically with flexion stretches although she reports very minimal pain with ADLs & takes OTC tylenol prn for sx management at home. She DC use of Dynasplint brace ~ 3 weeks ago as she felt she had plateaued in terms of progress with this.   She is now able to mow her lawn & is walking up to 1 mile 3-4 times/week without complaints, very minimal ext lag with ambulation although she is able to achieve full TKE passively. She reports 50-75% improvement overall & feels ready for DC to home program & will resume senior fitness program at recreational fitness center three times/week. .  
Assessments/Recommendations: Discontinue therapy. Progressing towards or have reached established goals. If you have any questions/comments please contact us directly at [phone]. Thank you for allowing us to assist in the care of your patient. Therapist Signature: AIME Thomas, cert MDT Date: 7-47-50 Reporting Period: 3-21-19 to 4-23-19 Time: 10:53 AM

## 2019-04-23 NOTE — PROGRESS NOTES
PHYSICAL THERAPY - DAILY TREATMENT NOTE Patient Name: Medina Hayes        Date: 2019 : 1935   YES Patient  Verified Visit #:     Insurance: Payor: Kandi Claude / Plan: [address] [place] [address] HMO / Product Type: Managed Care Medicare / In time: 10:30 A Out time: 11:35 A Total Treatment Time: 60  
 
BCBS/Medicare Time Tracking (below) Total Timed Codes (min):  50 1:1 Treatment Time:  40 TREATMENT AREA = Knee pain, right [M25.561] SUBJECTIVE Pain Level (on 0 to 10 scale):  0  / 10 Medication Changes/New allergies or changes in medical history, any new surgeries or procedures? NO    If yes, update Summary List  
Subjective Functional Status/Changes:  []  No changes reported See DC summary Modalities Rationale:     decrease pain to improve patient's ability to return to pain-free ambulation  
 min [] Estim, type/location:   
                                 []  att     []  unatt     []  w/US     []  w/ice    []  w/heat 
 min []  Mechanical Traction: type/lbs   
               []  pro   []  sup   []  int   []  cont    []  before manual    []  after manual  
 min []  Ultrasound, settings/location:    
 min []  Iontophoresis w/ dexamethasone, location:   
                                           []  take home patch       []  in clinic  
10 min [x]  Ice     []  Heat    location/position: Supine to R knee  
 min []  Vasopneumatic Device, press/temp:   
 min []  Other:   
[] Skin assessment post-treatment (if applicable):   
[]  intact    []  redness- no adverse reaction    
[]redness  adverse reaction:     
50/ 
40 min Therapeutic Exercise:  [x]  See flow sheet Rationale:      increase ROM and increase strength to improve the patients ability to return to prolonged ambulation Billed With/As: 
 [] TE 
 [] TA 
 [] Neuro 
 [] Self Care Patient Education: [x] Review HEP [] Progressed/Changed HEP based on: [] positioning   [] body mechanics   [] transfers   [] heat/ice application   
[] other:   
Other Objective/Functional Measures: FOTO 78 points MMT & ROM, see DC summary Post Treatment Pain Level (on 0 to 10) scale:   0  / 10 ASSESSMENT Assessment/Changes in Function:  
 
Pt indep with program & feels ready for DC to home program 
  
[]  See Progress Note/Recertification Patient will continue to benefit from skilled PT services to instruct in home and community integration to attain remaining goals. Progress toward goals / Updated goals: LTG 1, 3 met, 2 & 4 partially met PLAN 
[]  Upgrade activities as tolerated NO Continue plan of care [x]  Discharge due to : Goals partially met  
[]  Other:   
 
Therapist: Jono Angulo Date: 4/23/2019 Time: 10:43 AM  
 
Future Appointments Date Time Provider Angela Tao 4/25/2019 10:30 AM Louise Quispe, PT [facility]

## 2019-04-25 ENCOUNTER — APPOINTMENT (OUTPATIENT)
Dept: PHYSICAL THERAPY | Age: 84
End: 2019-04-25
Payer: MEDICARE